# Patient Record
Sex: MALE | Race: OTHER | HISPANIC OR LATINO
[De-identification: names, ages, dates, MRNs, and addresses within clinical notes are randomized per-mention and may not be internally consistent; named-entity substitution may affect disease eponyms.]

---

## 2019-12-06 PROBLEM — Z00.00 ENCOUNTER FOR PREVENTIVE HEALTH EXAMINATION: Status: ACTIVE | Noted: 2019-12-06

## 2019-12-10 ENCOUNTER — APPOINTMENT (OUTPATIENT)
Dept: GERIATRICS | Facility: CLINIC | Age: 79
End: 2019-12-10
Payer: COMMERCIAL

## 2019-12-10 VITALS
SYSTOLIC BLOOD PRESSURE: 90 MMHG | OXYGEN SATURATION: 97 % | TEMPERATURE: 97.9 F | HEIGHT: 64 IN | HEART RATE: 90 BPM | BODY MASS INDEX: 23.39 KG/M2 | WEIGHT: 137 LBS | DIASTOLIC BLOOD PRESSURE: 70 MMHG

## 2019-12-10 DIAGNOSIS — M41.9 SCOLIOSIS, UNSPECIFIED: ICD-10-CM

## 2019-12-10 PROCEDURE — 99205 OFFICE O/P NEW HI 60 MIN: CPT

## 2019-12-10 NOTE — HISTORY OF PRESENT ILLNESS
[2] : 2) Feeling down, depressed, or hopeless for more than half of the days [PHQ-2 Score ___] : PHQ-2 Score [unfilled] [FreeTextEntry1] : 79 year old man\[place] 10\par [employer]\par  of company\par lives in [place]\par retired about 5 years - here alone\par \par wife is pediatric anesthesioloigst - she is in her late 50s\par \par Dr Brown at [place]\par \par came for memory loss\par \par h/o bleeding ulcer many years ago\par \par flu vaccine - needs for 2018\par colonscopy 2017\par dexa 11/18\par \par drinks 20 drinks a week\par wine with dinner\par  he has been depressed and is on zoloft\par \par searches for words\par forgets why he went into room\par retired\par ok with driving - never lost\par concentrates a lot\par able to pay bills/paper work\par hard to multi-task - even music and computer at the same time\par \par spoke to wife on phone\par 2014 cog tests - Mild Cognitive Impairment\par MRI and spect scans 2016\par neuro tests - Dr king \par Dr Carla Maya\par mild decline\par 2018 - neuropsych tests - [facility]\par MOCA 18/30\par bilateral hypofusion -frontal,temporal and parietal - on SPECT scan - suggests early AD\par \par had labs august - wnl\par \par tried aricept in summer took two weeks - gave up\par \par compliant with meds\par \par has been examined by a cardiologist Ruth\par able to walk long distances\par aortic aneurysm - mild\par

## 2019-12-10 NOTE — HISTORY OF PRESENT ILLNESS
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 [place]\par \par came for memory loss\par \par h/o bleeding ulcer many years ago\par \par flu vaccine - needs for 2018\par colonscopy 2017\par dexa 11/18\par \par drinks 20 drinks a week\par wine with dinner\par  he has been depressed and is on zoloft\par \par searches for words\par forgets why he went into room\par retired\par ok with driving - never lost\par concentrates a lot\par able to pay bills/paper work\par hard to multi-task - even music and computer at the same time\par \par spoke to wife on phone\par 2014 cog tests - Mild Cognitive Impairment\par MRI and spect scans 2016\par neuro tests - Dr king \par Dr Carla Maya\par mild decline\par 2018 - neuropsych tests - [facility]\par MOCA 18/30\par bilateral hypofusion -frontal,temporal and parietal - on SPECT scan - suggests early AD\par \par had labs august - wnl\par \par tried aricept in summer took two weeks - gave up\par \par compliant with meds\par \par has been examined by a cardiologist Ruth\par able to walk long distances\par aortic aneurysm - mild\par

## 2019-12-10 NOTE — SOCIAL HISTORY
[Fully functional (bathing, dressing, toileting, transferring, walking, feeding)] : Fully functional (bathing, dressing, toileting, transferring, walking, feeding) [Fully functional (using the telephone, shopping, preparing meals, housekeeping, doing laundry, using transportation,] : Fully functional and needs no help or supervision to perform IADLs (using the telephone, shopping, preparing meals, housekeeping, doing laundry, using transportation, managing medications and managing finances) [Two or more falls in past year] : Patient reported two or more falls in the past year [Carbon Monoxide Detector] : carbon monoxide detector [Smoke Detector] : smoke detector [Seat Belt] :  uses seat belt [Driving] : driving

## 2019-12-10 NOTE — ASSESSMENT
[FreeTextEntry1] : [facility] 25/30\par clock ok\par \par \par \par spoke to wife\par getting copies of neuropsych tests\par spect scans\par labs\par tsh, b12 , lipids\par psa - all normal\par \par to repeat neuropsych tests\par OT for memory help\par consider counseling\par \par pt reluctant re above\par spent 90 mins discussing above with pt and wife on conference call

## 2019-12-10 NOTE — SOCIAL HISTORY
[Two or more falls in past year] : Patient reported two or more falls in the past year [Fully functional (using the telephone, shopping, preparing meals, housekeeping, doing laundry, using transportation,] : Fully functional and needs no help or supervision to perform IADLs (using the telephone, shopping, preparing meals, housekeeping, doing laundry, using transportation, managing medications and managing finances) [Fully functional (bathing, dressing, toileting, transferring, walking, feeding)] : Fully functional (bathing, dressing, toileting, transferring, walking, feeding) [Smoke Detector] : smoke detector [Carbon Monoxide Detector] : carbon monoxide detector [Seat Belt] :  uses seat belt [Driving] : driving

## 2019-12-10 NOTE — ASSESSMENT
[FreeTextEntry1] : [facility] 25/30\par clock ok\par \par \par \par spoke to wife\par getting copies of neuropsych tests\par spect scans\par labs\par tsh, b12 , lipids\par psa - all normal\par \par to repeat neuropsych tests\par OT for memory help\par consider counseling\par \par pt reluctant re above\par spent 90 mins discussing above with pt and wife on conference call

## 2021-04-12 ENCOUNTER — APPOINTMENT (OUTPATIENT)
Dept: NEUROLOGY | Facility: CLINIC | Age: 81
End: 2021-04-12
Payer: COMMERCIAL

## 2021-04-12 VITALS
DIASTOLIC BLOOD PRESSURE: 70 MMHG | BODY MASS INDEX: 20.49 KG/M2 | SYSTOLIC BLOOD PRESSURE: 111 MMHG | TEMPERATURE: 97.2 F | HEART RATE: 71 BPM | WEIGHT: 120 LBS | HEIGHT: 64 IN

## 2021-04-12 PROCEDURE — 99072 ADDL SUPL MATRL&STAF TM PHE: CPT

## 2021-04-12 PROCEDURE — 99204 OFFICE O/P NEW MOD 45 MIN: CPT

## 2021-04-13 RX ORDER — SERTRALINE HYDROCHLORIDE 100 MG/1
100 TABLET, FILM COATED ORAL DAILY
Refills: 0 | Status: DISCONTINUED | COMMUNITY
Start: 2019-12-10 | End: 2021-04-13

## 2021-04-13 RX ORDER — ROSUVASTATIN CALCIUM 40 MG/1
40 TABLET, FILM COATED ORAL
Qty: 90 | Refills: 3 | Status: DISCONTINUED | COMMUNITY
Start: 2019-12-10 | End: 2021-04-13

## 2021-04-15 NOTE — REVIEW OF SYSTEMS
[Anxiety] : anxiety [Depression] : depression [Cough] : cough [Negative] : Heme/Lymph [FreeTextEntry2] : change in weight  [de-identified] : tremor  [FreeTextEntry9] : back pain

## 2021-04-15 NOTE — DATA REVIEWED
[de-identified] : 4/8/21 moderate chronic microvascular ischemic changes, progressed from 2014. Moderate cerebral volume loss, progressed from 2014. \par images personally reviewed.  [de-identified] : 4/8/21 carotid ultrasound <50% bilaterally. \par \par 1/21/21 Vitamin B12 697\par 1/21/21 Lyme negative\par 1/21/21 TSH 1.43\par HgA1C 5.9\par

## 2021-04-15 NOTE — PHYSICAL EXAM
[FreeTextEntry1] : Physical examination \par General: No acute distress, Awake, Alert.   \par OTHER: aphasia \par \par Mental status \par Awake, alert, and oriented to person, time (April 2021) and place, Normal attention span and concentration,  Fund of knowledge intact.   \par Delayed recall 0/3 with hints 3/3.\par 4Q1$\par 7Q1.75$\par Very hesitant speech with word finding difficulty. \par  \par Cranial Nerves \par II: VFF  \par III, IV, VI: PERRL, EOMI.   \par V: Facial sensation is normal B/L.   \par VII: Facial strength is normal B/L. \par \par \par VIII: Gross hearing is intact.   \par \par \par IX, X: Palate is midline and elevates symmetrically.   \par XI: Trapezius normal strength.   \par XII: Tongue midline without atrophy or fasciculations. \par \par Motor exam  \par Muscle tone - cogwheel arms. \par No atrophy or fasciculations \par Muscle Strength: arms and legs, proximal and distal flexors and extensors are normal \par \par No UE drift.\par \par Reflexes \par Arms 2 \par legs 1 \par \par Plantars right: mute.   \par Plantars left: mute.   \par \par Absent ankle jerks.   \par \par Coordination \par Finger to nose: Normal.  \par Heel to shin: Normal.   \par \par Sensory \par Intact sensation to vibration and cold.\par \par Gait \par Slow, wide based gait. \par \par

## 2021-04-15 NOTE — ASSESSMENT
[FreeTextEntry1] : Fritz Salinas is a 80 year old man with progressive word finding difficulties and memory loss. 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  \par Word finding difficulties - speech and cognitive therapy.\par \par Follow up in 3 months. \par \par I discussed in detail with the patient and family the diagnosis, prognosis, treatment plan and answered all of their questions.\par \par Discussed with PMD.

## 2021-04-15 NOTE — HISTORY OF PRESENT ILLNESS
[FreeTextEntry1] : History obtained from patient and wife Dr. Soniya Brown\par \par Fritz Salinas is a 80 year old man with a history of duodenal ulcers and scoliosis presenting for a consultation for word finding difficulties and memory loss. \par \par He endorses that he has been having word finding difficulties and slow, progressive memory difficulties for over seven years. Mr. Salinas is able to manage bills at home as well as investments without errors. He no longer drives at night or on highways. He notes that his language and word finding difficulties have also gradually, progressively worsened over seven years and have become significantly worse in the past year and a half. His wife endorses that he is also more socially isolated due to COVID-19. Mr. Salinas has difficulties with new problems and when a routine is not followed.  He had neuropsychological evaluation done many years ago (previous records not available) and his first MRI was performed in 2014.  He has been taking Donepezil 10mg daily for three months without side effects.\par \par He denies any history of stroke. He was on aspirin many years ago. \par \par The remaining neurological review of systems is negative.

## 2021-07-15 ENCOUNTER — APPOINTMENT (OUTPATIENT)
Dept: NEUROLOGY | Facility: CLINIC | Age: 81
End: 2021-07-15
Payer: COMMERCIAL

## 2021-07-15 VITALS
WEIGHT: 120 LBS | TEMPERATURE: 97.6 F | DIASTOLIC BLOOD PRESSURE: 79 MMHG | SYSTOLIC BLOOD PRESSURE: 120 MMHG | BODY MASS INDEX: 20.49 KG/M2 | HEIGHT: 64 IN | HEART RATE: 54 BPM

## 2021-07-15 PROCEDURE — 99072 ADDL SUPL MATRL&STAF TM PHE: CPT

## 2021-07-15 PROCEDURE — 99214 OFFICE O/P EST MOD 30 MIN: CPT

## 2021-07-16 NOTE — HISTORY OF PRESENT ILLNESS
[FreeTextEntry1] : There has been no significant changes since his last visit.  He has word finding difficulty predominantly.  He has good days and not his good days.  He has depression.  He is able to keep track of his stocks and perform most of his IADLs.\par He has had worsening over the past 7 years.\par He is tolerating memantine ER 28 mg daily and donepezil 10 mg daily.\par He does not want to repeat neuropsychological evaluation.\par He has had speech therapy in the past and does not want to do this again.\par \par \par Note from April 12, 2021\par History obtained from patient and wife Dr. Soniya Brown\par \par Fritz Salinas is a 80 year old man with a history of duodenal ulcers and scoliosis presenting for a consultation for word finding difficulties and memory loss. \par \par He endorses that he has been having word finding difficulties and slow, progressive memory difficulties for over seven years. Mr. Salinas is able to manage bills at home as well as investments without errors. He no longer drives at night or on highways. He notes that his language and word finding difficulties have also gradually, progressively worsened over seven years and have become significantly worse in the past year and a half. His wife endorses that he is also more socially isolated due to COVID-19. Mr. Salinas has difficulties with new problems and when a routine is not followed.  He had neuropsychological evaluation done many years ago (previous records not available) and his first MRI was performed in 2014.  He has been taking Donepezil 10mg daily for three months without side effects.\par \par He denies any history of stroke. He was on aspirin many years ago. \par \par The remaining neurological review of systems is negative.

## 2021-07-16 NOTE — DATA REVIEWED
[de-identified] : 4/8/21 moderate chronic microvascular ischemic changes, progressed from 2014. Moderate cerebral volume loss, progressed from 2014. \par images personally reviewed.  [de-identified] : 4/8/21 carotid ultrasound <50% bilaterally. \par \par 1/21/21 Vitamin B12 697\par 1/21/21 Lyme negative\par 1/21/21 TSH 1.43\par HgA1C 5.9\par

## 2021-07-16 NOTE — REVIEW OF SYSTEMS
[Negative] : Heme/Lymph [Anxiety] : anxiety [Depression] : depression [Cough] : cough [FreeTextEntry2] : change in weight  [de-identified] : tremor  [FreeTextEntry9] : back pain

## 2021-07-16 NOTE — PHYSICAL EXAM
[FreeTextEntry1] : Physical examination \par General: No acute distress, Awake, Alert.   \par aphasia \par \par Mental status \par Awake, alert, and oriented to person, time and place, Normal attention span and concentration,  Fund of knowledge intact.   \par Delayed recall 0/3 with hints 3/3.\par 4Q1$\par 7Q1.75$\par Very hesitant speech with word finding difficulty. \par  \par Cranial Nerves \par II: VFF  \par III, IV, VI: PERRL, EOMI.   \par V: Facial sensation is normal B/L.   \par VII: Facial strength is normal B/L. \par \par \par VIII: Gross hearing is intact.   \par \par \par IX, X: Palate is midline and elevates symmetrically.   \par XI: Trapezius normal strength.   \par XII: Tongue midline without atrophy or fasciculations. \par \par Motor exam  \par Muscle tone - cogwheel arms. \par No atrophy or fasciculations \par Muscle Strength: arms and legs, proximal and distal flexors and extensors are normal \par \par No UE drift.\par \par \par Coordination \par Finger to nose: Normal.  \par Heel to shin: Normal.   \par \par Gait \par Slow, wide based gait. \par \par

## 2021-07-16 NOTE — ASSESSMENT
[FreeTextEntry1] : Fritz Salinas is a 80 year old man with progressive word finding difficulties and memory loss. 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 \par \par I discussed in detail with the patient and family the diagnosis, prognosis, treatment plan and answered all of their questions.

## 2022-01-11 ENCOUNTER — APPOINTMENT (OUTPATIENT)
Dept: NEUROLOGY | Facility: CLINIC | Age: 82
End: 2022-01-11

## 2022-01-11 ENCOUNTER — APPOINTMENT (OUTPATIENT)
Dept: NEUROLOGY | Facility: CLINIC | Age: 82
End: 2022-01-11
Payer: COMMERCIAL

## 2022-01-11 PROCEDURE — 99215 OFFICE O/P EST HI 40 MIN: CPT | Mod: 95

## 2022-01-11 RX ORDER — MEMANTINE HYDROCHLORIDE 21 MG/1
21 CAPSULE, EXTENDED RELEASE ORAL
Qty: 7 | Refills: 0 | Status: DISCONTINUED | COMMUNITY
Start: 2021-04-12 | End: 2022-01-11

## 2022-01-11 RX ORDER — ASPIRIN 81 MG
81 TABLET, DELAYED RELEASE (ENTERIC COATED) ORAL
Refills: 0 | Status: ACTIVE | COMMUNITY

## 2022-01-11 RX ORDER — MEMANTINE HYDROCHLORIDE 7 MG/1
7 CAPSULE, EXTENDED RELEASE ORAL
Qty: 7 | Refills: 0 | Status: DISCONTINUED | COMMUNITY
Start: 2021-04-12 | End: 2022-01-11

## 2022-01-11 RX ORDER — MEMANTINE HYDROCHLORIDE 14 MG/1
14 CAPSULE, EXTENDED RELEASE ORAL
Qty: 7 | Refills: 0 | Status: DISCONTINUED | COMMUNITY
Start: 2021-04-12 | End: 2022-01-11

## 2022-01-11 RX ORDER — ESCITALOPRAM OXALATE 20 MG/1
TABLET ORAL
Refills: 0 | Status: DISCONTINUED | COMMUNITY
End: 2022-01-11

## 2022-01-11 NOTE — ASSESSMENT
[FreeTextEntry1] : Fritz Salinas is a 81 year old man with progressive word finding difficulties and memory loss. 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 \par \par Follow up in office with Dr. Navarrete or Dr. Shirley for evaluation and to reestablish care. \par \par I discussed in detail with the patient and family the diagnosis, prognosis, treatment plan and answered all of their questions.

## 2022-01-11 NOTE — REASON FOR VISIT
[Home] : at home, [unfilled] , at the time of the visit. [Medical Office: ([facility])___] : at the medical office located in  [Verbal consent obtained from patient] : the patient, [unfilled] [FreeTextEntry1] : speech and memory difficulties.

## 2022-01-11 NOTE — DATA REVIEWED
[de-identified] : 4/8/21 moderate chronic microvascular ischemic changes, progressed from 2014. Moderate cerebral volume loss, progressed from 2014. \par images personally reviewed.  [de-identified] : 4/8/21 carotid ultrasound <50% bilaterally. \par \par 1/21/21 Vitamin B12 697\par 1/21/21 Lyme negative\par 1/21/21 TSH 1.43\par HgA1C 5.9\par

## 2022-01-11 NOTE — HISTORY OF PRESENT ILLNESS
[FreeTextEntry1] : History obtained from patient and wife Dr. Soniya Brown\par \par Fritz Salinas is a right handed 81 year old man with a history of duodenal ulcers and scoliosis presenting for a follow up for word finding difficulties and memory loss. \par \par As per his wife, he has some cognitive improvement since last visit. He has difficulty learning new tasks and difficulty with short term memory. She keeps his appointments. He pays the bills, keeps track of his stocks, organizes his own medications, and performs most of his IADLs. He is tolerating Memantine ER 28mg daily and Donepezil 10mg daily. \par \par He reports his speech is unchanged. Denies dysphagia. \par \par He was noted to have a tremor in both hands with action on his last visit with his PCP in November. Mr. Rainey denies that the tremor interferes with eating or drinking. Denies rest tremor. His wife noted some shuffling of his gait. Denies freezing of gait. He walks three to four miles daily. Denies any slowness with his daily activities. He suffered one fall a few months ago because his dog pulled him and he fractured his right fifth digit. After the fracture, he cannot fully close his right hand and did PT and OT. Does not use any assistive device.  He had difficulty remembering the exercises for PT and OT.\par \par  Endorses vivid dreams for the past year, denies acting out dreams. Denies micrographia but has handwriting changes.\par \par Denies weakness, numbness or tingling.\par \par The remaining neurological review of systems is negative. \par \par 7/15/21\par There has been no significant changes since his last visit.  He has word finding difficulty predominantly.  He has good days and not his good days.  He has depression.  He is able to keep track of his stocks and perform most of his IADLs.\par He has had worsening over the past 7 years.\par He is tolerating memantine ER 28 mg daily and donepezil 10 mg daily.\par He does not want to repeat neuropsychological evaluation.\par He has had speech therapy in the past and does not want to do this again.\par \par \par Note from April 12, 2021\par History obtained from patient and wife Dr. Soniya Brown\par \par Fritz Salinas is a 80 year old man with a history of duodenal ulcers and scoliosis presenting for a consultation for word finding difficulties and memory loss. \par \par He endorses that he has been having word finding difficulties and slow, progressive memory difficulties for over seven years. Mr. Salinas is able to manage bills at home as well as investments without errors. He no longer drives at night or on highways. He notes that his language and word finding difficulties have also gradually, progressively worsened over seven years and have become significantly worse in the past year and a half. His wife endorses that he is also more socially isolated due to COVID-19. Mr. Salinas has difficulties with new problems and when a routine is not followed.  He had neuropsychological evaluation done many years ago (previous records not available) and his first MRI was performed in 2014.  He has been taking Donepezil 10mg daily for three months without side effects.\par \par He denies any history of stroke. He was on aspirin many years ago. \par \par The remaining neurological review of systems is negative.

## 2022-01-11 NOTE — PHYSICAL EXAM
[FreeTextEntry1] : Exam limited due to telehealth. \par \par Physical examination \par General: No acute distress, Awake, Alert.   \par aphasia \par \par other: no tremor noted during telehealth appointment. \par \par Mental status \par Awake, alert, and oriented to person, time and place, Normal attention span and concentration,  Fund of knowledge intact.   \par Delayed recall 0/3 with hints 2/3.\par 4Q1$\par 7Q1.75$\par not able to do serial 7 calculations\par Very hesitant speech with word finding difficulty. \par  \par Cranial Nerves \par III, IV, VI:  EOMI.   \par V: Facial sensation is normal B/L.   \par VII: Facial strength is normal B/L. \par \par \par VIII: Gross hearing is intact.   \par \par \par IX, X: Palate is midline and elevates symmetrically.   \par \par XII: Tongue midline without atrophy or fasciculations. \par \par Motor exam  \par  Muscle Strength: arms and legs, proximal and distal flexors and extensors are normal (5/5 -tested with wife-physician)\par \par No UE drift.\par \par \par Coordination \par Finger to nose: Normal.  \par  \par \par Gait \par Slow, wide based gait. No freezing of gait noted. \par \par 7/15/21\par \par Physical examination \par General: No acute distress, Awake, Alert.   \par aphasia \par \par Mental status \par Awake, alert, and oriented to person, time and place, Normal attention span and concentration,  Fund of knowledge intact.   \par Delayed recall 0/3 with hints 3/3.\par 4Q1$\par 7Q1.75$\par Very hesitant speech with word finding difficulty. \par  \par Cranial Nerves \par II: VFF  \par III, IV, VI: PERRL, EOMI.   \par V: Facial sensation is normal B/L.   \par VII: Facial strength is normal B/L. \par \par \par VIII: Gross hearing is intact.   \par \par \par IX, X: Palate is midline and elevates symmetrically.   \par XI: Trapezius normal strength.   \par XII: Tongue midline without atrophy or fasciculations. \par \par Motor exam  \par Muscle tone - cogwheel arms. \par No atrophy or fasciculations \par Muscle Strength: arms and legs, proximal and distal flexors and extensors are normal \par \par No UE drift.\par \par \par Coordination \par Finger to nose: Normal.  \par Heel to shin: Normal.   \par \par Gait \par Slow, wide based gait. \par \par

## 2022-05-23 ENCOUNTER — RESULT REVIEW (OUTPATIENT)
Age: 82
End: 2022-05-23

## 2022-07-06 ENCOUNTER — APPOINTMENT (OUTPATIENT)
Dept: NEUROLOGY | Facility: CLINIC | Age: 82
End: 2022-07-06

## 2022-07-06 VITALS
WEIGHT: 129 LBS | SYSTOLIC BLOOD PRESSURE: 102 MMHG | DIASTOLIC BLOOD PRESSURE: 63 MMHG | BODY MASS INDEX: 22.02 KG/M2 | HEIGHT: 64 IN | HEART RATE: 62 BPM

## 2022-07-06 PROCEDURE — 99215 OFFICE O/P EST HI 40 MIN: CPT

## 2022-07-06 NOTE — DISCUSSION/SUMMARY
[FreeTextEntry1] : Fritz Blanco is a 81 year old M with a PMHx of HLD, BPH, Depression, HTN, and Primary progressive aphasia, nonfluent variant. He was referred by Deepika Mejia NP with concern for possible Parkinson disease as indicated by his PCP. Patient is a former physician.\par \par The patient's history is concerning for Dementia. 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 FTD with primary progressive aphasia subtype vs. vascular dementia. Patient has significant short term memory loss and word finding difficulties, but still retains his insight into his deficits. \par \par The specific type of dementia that he has has not officially been delineated. I recommend obtaining an FDG-PET scan now to decipher the diagnosis, prognosis and future planning.\par \par Encouraged to increase exercise and physical activity and maintain an active social and intellectual life.\par \par All questions were answered to their satisfaction. I spent 60 minutes on this encounter.

## 2022-07-06 NOTE — REVIEW OF SYSTEMS
[FreeTextEntry1] : Pertinent positives and negatives are outlined in the HPI. dietitian/nutrition services

## 2022-07-06 NOTE — HISTORY OF PRESENT ILLNESS
[FreeTextEntry1] : Fritz Blanco is a 81 year old M with a PMHx of HLD, BPH, Depression, HTN, and Primary progressive aphasia, nonfluent variant. He was referred by Deepika Mejia NP with concern for possible Parkinson disease as indicated by his PCP. Patient is a former physician.\par \par His memory is getting worse. He repeats questions and gets disoriented regarding the date. He fell a month ago, hit the back of his head, and he had a syncopal event that night on the toilet and went to the ED. He had a mechanical fall. \par There was concern that he is rigid in his gait during the Spring. \par Memory issues started in 2014.\par \par No trouble turning or adjusting in bed at night. He does shuffle sometimes and he goes to PT, he drags the right leg a bit. He is off balance with scoliosis. No trouble with buttons, zippers, shoelaces or cutting food. \par \par No changes in sense of smell.\par He has stooped posture.\par No changes in facial expression. \par He has some decrease in the volume of his voice.\par No trouble swallowing.\par Handwriting has changed, it is worse, unclear if it is worse. He usually only signs.\par No constipation, having a daily bowel movement. \par No trouble with urination. No nocturia. He was having retention in the past related to BPH.\par No trouble sleeping. He has vivid dreams that are not pleasant. He does talk in his sleep. \par No dizziness or lightheadedness when standing. \par No history of hallucinations.\par Mood is not depressed or anxious. On sertraline for many years. \par \par He is currently on Memantine and Donepezil.\par \par MRI brain without contrast done in 4/2021 was significant for moderate chronic microvascular disease that had progressed since 2014.\par Per patient's wife, he had an FDG-PET scan done in 2014 that was suggestive of Alzheimer's vs. Depression. 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

## 2022-07-06 NOTE — PHYSICAL EXAM
[Person] : oriented to person [Place] : oriented to place [Concentration Intact] : a decrease in concentrating ability was observed [Comprehension] : comprehension intact [Cranial Nerves Optic (II)] : visual acuity intact bilaterally,  visual fields full to confrontation, pupils equal round and reactive to light [Cranial Nerves Oculomotor (III)] : extraocular motion intact [Cranial Nerves Trigeminal (V)] : facial sensation intact symmetrically [Cranial Nerves Facial (VII)] : face symmetrical [Cranial Nerves Vestibulocochlear (VIII)] : hearing was intact bilaterally [Cranial Nerves Glossopharyngeal (IX)] : tongue and palate midline [Cranial Nerves Accessory (XI - Cranial And Spinal)] : head turning and shoulder shrug symmetric [Cranial Nerves Hypoglossal (XII)] : there was no tongue deviation with protrusion [Motor Strength] : muscle strength was normal in all four extremities [Paresis Pronator Drift Right-Sided] : no pronator drift on the right [Paresis Pronator Drift Left-Sided] : no pronator drift on the left [Sensation Tactile Decrease] : light touch was intact [Coordination - Dysmetria Impaired Finger-to-Nose Bilateral] : not present [1+] : Patella left 1+ [0] : Ankle jerk left 0 [FreeTextEntry6] : trigger finger of the right 5th digit [FreeTextEntry1] : No bradykinesia, rigidity or tremors.\par \par Gait is wide based, with decreased arm swing, right more than left. No significant shuffling. No freezing of gait.\par Posture is stooped.\par One attempt to stand with arms crossed.\par Postural reflexes are intact.

## 2022-10-05 ENCOUNTER — RESULT REVIEW (OUTPATIENT)
Age: 82
End: 2022-10-05

## 2022-10-05 ENCOUNTER — TRANSCRIPTION ENCOUNTER (OUTPATIENT)
Age: 82
End: 2022-10-05

## 2022-10-12 ENCOUNTER — TRANSCRIPTION ENCOUNTER (OUTPATIENT)
Age: 82
End: 2022-10-12

## 2022-10-18 ENCOUNTER — TRANSCRIPTION ENCOUNTER (OUTPATIENT)
Age: 82
End: 2022-10-18

## 2022-10-25 ENCOUNTER — NON-APPOINTMENT (OUTPATIENT)
Age: 82
End: 2022-10-25

## 2022-12-06 ENCOUNTER — APPOINTMENT (OUTPATIENT)
Dept: NEUROLOGY | Facility: CLINIC | Age: 82
End: 2022-12-06

## 2023-01-04 ENCOUNTER — APPOINTMENT (OUTPATIENT)
Dept: NEUROLOGY | Facility: CLINIC | Age: 83
End: 2023-01-04

## 2023-03-17 ENCOUNTER — TRANSCRIPTION ENCOUNTER (OUTPATIENT)
Age: 83
End: 2023-03-17

## 2023-03-24 ENCOUNTER — TRANSCRIPTION ENCOUNTER (OUTPATIENT)
Age: 83
End: 2023-03-24

## 2023-03-27 ENCOUNTER — TRANSCRIPTION ENCOUNTER (OUTPATIENT)
Age: 83
End: 2023-03-27

## 2023-03-28 ENCOUNTER — TRANSCRIPTION ENCOUNTER (OUTPATIENT)
Age: 83
End: 2023-03-28

## 2023-03-31 ENCOUNTER — TRANSCRIPTION ENCOUNTER (OUTPATIENT)
Age: 83
End: 2023-03-31

## 2023-04-07 ENCOUNTER — APPOINTMENT (OUTPATIENT)
Dept: GERIATRICS | Facility: CLINIC | Age: 83
End: 2023-04-07
Payer: COMMERCIAL

## 2023-04-07 VITALS
BODY MASS INDEX: 24.48 KG/M2 | DIASTOLIC BLOOD PRESSURE: 78 MMHG | TEMPERATURE: 97 F | SYSTOLIC BLOOD PRESSURE: 114 MMHG | HEART RATE: 58 BPM | WEIGHT: 142.6 LBS | OXYGEN SATURATION: 97 %

## 2023-04-07 DIAGNOSIS — E78.00 PURE HYPERCHOLESTEROLEMIA, UNSPECIFIED: ICD-10-CM

## 2023-04-07 DIAGNOSIS — Z80.0 FAMILY HISTORY OF MALIGNANT NEOPLASM OF DIGESTIVE ORGANS: ICD-10-CM

## 2023-04-07 DIAGNOSIS — I10 ESSENTIAL (PRIMARY) HYPERTENSION: ICD-10-CM

## 2023-04-07 DIAGNOSIS — Z82.49 FAMILY HISTORY OF ISCHEMIC HEART DISEASE AND OTHER DISEASES OF THE CIRCULATORY SYSTEM: ICD-10-CM

## 2023-04-07 DIAGNOSIS — N40.0 BENIGN PROSTATIC HYPERPLASIA WITHOUT LOWER URINARY TRACT SYMPMS: ICD-10-CM

## 2023-04-07 PROCEDURE — 99204 OFFICE O/P NEW MOD 45 MIN: CPT | Mod: 25

## 2023-04-07 PROCEDURE — 36415 COLL VENOUS BLD VENIPUNCTURE: CPT

## 2023-04-10 PROBLEM — I10 BENIGN ESSENTIAL HYPERTENSION: Status: ACTIVE | Noted: 2019-12-10

## 2023-04-10 PROBLEM — Z80.0 FAMILY HISTORY OF COLON CANCER: Status: ACTIVE | Noted: 2023-04-10

## 2023-04-10 PROBLEM — N40.0 BPH (BENIGN PROSTATIC HYPERPLASIA): Status: ACTIVE | Noted: 2019-12-10

## 2023-04-10 PROBLEM — E78.00 HIGH CHOLESTEROL: Status: ACTIVE | Noted: 2019-12-10

## 2023-04-10 PROBLEM — Z82.49 FAMILY HISTORY OF CARDIAC DISORDER: Status: ACTIVE | Noted: 2023-04-10

## 2023-04-10 RX ORDER — FINASTERIDE 1 MG/1
TABLET ORAL
Refills: 0 | Status: COMPLETED | COMMUNITY
End: 2023-04-10

## 2023-04-10 RX ORDER — DUTASTERIDE 0.5 MG/1
0.5 CAPSULE, LIQUID FILLED ORAL
Qty: 90 | Refills: 1 | Status: ACTIVE | COMMUNITY
Start: 2019-12-10

## 2023-04-10 RX ORDER — VALSARTAN 80 MG/1
80 TABLET, COATED ORAL DAILY
Refills: 0 | Status: COMPLETED | COMMUNITY
Start: 2019-12-10 | End: 2023-04-10

## 2023-04-10 NOTE — PHYSICAL EXAM
[No Acute Distress] : no acute distress [Well-Appearing] : well-appearing [Normal Sclera/Conjunctiva] : normal sclera/conjunctiva [PERRL] : pupils equal round and reactive to light [EOMI] : extraocular movements intact [Normal Outer Ear/Nose] : the outer ears and nose were normal in appearance [Normal Oropharynx] : the oropharynx was normal [Normal TMs] : both tympanic membranes were normal [No JVD] : no jugular venous distention [No Lymphadenopathy] : no lymphadenopathy [Supple] : supple [No Respiratory Distress] : no respiratory distress  [No Accessory Muscle Use] : no accessory muscle use [Clear to Auscultation] : lungs were clear to auscultation bilaterally [Normal Rate] : normal rate  [Regular Rhythm] : with a regular rhythm [Normal S1, S2] : normal S1 and S2 [No Carotid Bruits] : no carotid bruits [No Abdominal Bruit] : a ~M bruit was not heard ~T in the abdomen [Soft] : abdomen soft [Non Tender] : non-tender [Non-distended] : non-distended [Normal Bowel Sounds] : normal bowel sounds [Normal Posterior Cervical Nodes] : no posterior cervical lymphadenopathy [Normal Anterior Cervical Nodes] : no anterior cervical lymphadenopathy [No Spinal Tenderness] : no spinal tenderness [Scoliosis] : scoliosis [Grossly Normal Strength/Tone] : grossly normal strength/tone [No Focal Deficits] : no focal deficits [Speech Grossly Normal] : speech grossly normal [Normal Affect] : the affect was normal

## 2023-04-10 NOTE — HEALTH RISK ASSESSMENT
[Good] : ~his/her~  mood as  good [No] : In the past 12 months have you used drugs other than those required for medical reasons? No [No falls in past year] : Patient reported no falls in the past year [0] : 2) Feeling down, depressed, or hopeless: Not at all (0) [PHQ-2 Negative - No further assessment needed] : PHQ-2 Negative - No further assessment needed [[id number]] : 0 [Change in mental status noted] : Change in mental status noted [None] : None [With Significant Other] : lives with significant other [Retired] : retired [] :  [Feels Safe at Home] : Feels safe at home [Fully functional (bathing, dressing, toileting, transferring, walking, feeding)] : Fully functional (bathing, dressing, toileting, transferring, walking, feeding) [de-identified] : AD [de-identified] : Needs assistance [Never] : Never

## 2023-04-10 NOTE — HISTORY OF PRESENT ILLNESS
[Spouse] : spouse [FreeTextEntry1] : Re-establish care [de-identified] : 82 year old male with a history of HTN, BPH, depression / anxiety, Alzheimer's dementia, HLD, scoliosis who presents today to re-establish care.\par \par AD, following with Dr. Shirley.  On memantine ER 28 mg daily.  Lives with wife.  Able to perform self ADLs.  Wife managing finances.  \par \par HLD: Currently on crestor 10 mg daily and zetia 10 mg daily.  Last LP from 2021 with Dr. Brown. \par \par Depression / anxiety: On Sertraline 100 mg daily.

## 2023-04-13 LAB
25(OH)D3 SERPL-MCNC: 17.2 NG/ML
ANION GAP SERPL CALC-SCNC: 10 MMOL/L
BUN SERPL-MCNC: 19 MG/DL
CALCIUM SERPL-MCNC: 9.2 MG/DL
CHLORIDE SERPL-SCNC: 104 MMOL/L
CHOLEST SERPL-MCNC: 195 MG/DL
CO2 SERPL-SCNC: 26 MMOL/L
CREAT SERPL-MCNC: 0.8 MG/DL
EGFR: 89 ML/MIN/1.73M2
ESTIMATED AVERAGE GLUCOSE: 117 MG/DL
FOLATE SERPL-MCNC: >20 NG/ML
GLUCOSE SERPL-MCNC: 95 MG/DL
HBA1C MFR BLD HPLC: 5.7 %
HDLC SERPL-MCNC: 90 MG/DL
LDLC SERPL CALC-MCNC: 88 MG/DL
NONHDLC SERPL-MCNC: 105 MG/DL
POTASSIUM SERPL-SCNC: 4.3 MMOL/L
SODIUM SERPL-SCNC: 140 MMOL/L
TRIGL SERPL-MCNC: 85 MG/DL
TSH SERPL-ACNC: 1.08 UIU/ML
VIT B12 SERPL-MCNC: 740 PG/ML

## 2023-07-10 ENCOUNTER — APPOINTMENT (OUTPATIENT)
Dept: NEUROLOGY | Facility: CLINIC | Age: 83
End: 2023-07-10
Payer: COMMERCIAL

## 2023-07-10 DIAGNOSIS — G30.1 ALZHEIMER'S DISEASE WITH LATE ONSET: ICD-10-CM

## 2023-07-10 DIAGNOSIS — F02.818 ALZHEIMER'S DISEASE WITH LATE ONSET: ICD-10-CM

## 2023-07-10 PROCEDURE — 99442: CPT

## 2023-09-13 ENCOUNTER — APPOINTMENT (OUTPATIENT)
Dept: GERIATRICS | Facility: CLINIC | Age: 83
End: 2023-09-13
Payer: COMMERCIAL

## 2023-09-13 VITALS
HEART RATE: 72 BPM | OXYGEN SATURATION: 95 % | HEIGHT: 64 IN | WEIGHT: 136 LBS | TEMPERATURE: 97.4 F | SYSTOLIC BLOOD PRESSURE: 98 MMHG | DIASTOLIC BLOOD PRESSURE: 66 MMHG | BODY MASS INDEX: 23.22 KG/M2

## 2023-09-13 PROCEDURE — 99213 OFFICE O/P EST LOW 20 MIN: CPT | Mod: 25

## 2023-09-13 PROCEDURE — 36415 COLL VENOUS BLD VENIPUNCTURE: CPT

## 2023-09-13 RX ORDER — CHLORHEXIDINE GLUCONATE 4 %
LIQUID (ML) TOPICAL
Refills: 0 | Status: ACTIVE | COMMUNITY
Start: 2023-09-13

## 2023-09-13 RX ORDER — ALFUZOSIN HYDROCHLORIDE 10 MG/1
10 TABLET, EXTENDED RELEASE ORAL DAILY
Refills: 0 | Status: COMPLETED | COMMUNITY
End: 2023-09-13

## 2023-09-13 RX ORDER — QUETIAPINE FUMARATE 25 MG/1
25 TABLET ORAL
Qty: 30 | Refills: 0 | Status: COMPLETED | COMMUNITY
Start: 2023-07-10 | End: 2023-09-13

## 2023-09-15 LAB
25(OH)D3 SERPL-MCNC: 26.4 NG/ML
ALBUMIN SERPL ELPH-MCNC: 4.3 G/DL
ALP BLD-CCNC: 67 U/L
ALT SERPL-CCNC: 55 U/L
ANION GAP SERPL CALC-SCNC: 13 MMOL/L
APPEARANCE: CLEAR
AST SERPL-CCNC: 59 U/L
BACTERIA UR CULT: NORMAL
BACTERIA: NEGATIVE /HPF
BASOPHILS # BLD AUTO: 0.05 K/UL
BASOPHILS NFR BLD AUTO: 0.7 %
BILIRUB SERPL-MCNC: 0.4 MG/DL
BILIRUBIN URINE: NEGATIVE
BLOOD URINE: NEGATIVE
BUN SERPL-MCNC: 22 MG/DL
CALCIUM SERPL-MCNC: 9.3 MG/DL
CAST: 0 /LPF
CHLORIDE SERPL-SCNC: 104 MMOL/L
CO2 SERPL-SCNC: 21 MMOL/L
COLOR: YELLOW
CREAT SERPL-MCNC: 1.08 MG/DL
EGFR: 69 ML/MIN/1.73M2
EOSINOPHIL # BLD AUTO: 0.62 K/UL
EOSINOPHIL NFR BLD AUTO: 8.6 %
EPITHELIAL CELLS: 0 /HPF
ESTIMATED AVERAGE GLUCOSE: 114 MG/DL
GLUCOSE QUALITATIVE U: NEGATIVE MG/DL
GLUCOSE SERPL-MCNC: 109 MG/DL
HBA1C MFR BLD HPLC: 5.6 %
HCT VFR BLD CALC: 39.3 %
HGB BLD-MCNC: 13.1 G/DL
IMM GRANULOCYTES NFR BLD AUTO: 0.4 %
KETONES URINE: ABNORMAL MG/DL
LEUKOCYTE ESTERASE URINE: NEGATIVE
LYMPHOCYTES # BLD AUTO: 1.38 K/UL
LYMPHOCYTES NFR BLD AUTO: 19.1 %
MAN DIFF?: NORMAL
MCHC RBC-ENTMCNC: 32.1 PG
MCHC RBC-ENTMCNC: 33.3 GM/DL
MCV RBC AUTO: 96.3 FL
MICROSCOPIC-UA: NORMAL
MONOCYTES # BLD AUTO: 0.54 K/UL
MONOCYTES NFR BLD AUTO: 7.5 %
NEUTROPHILS # BLD AUTO: 4.61 K/UL
NEUTROPHILS NFR BLD AUTO: 63.7 %
NITRITE URINE: NEGATIVE
PH URINE: 6
PLATELET # BLD AUTO: 221 K/UL
POTASSIUM SERPL-SCNC: 4.2 MMOL/L
PROT SERPL-MCNC: 6.8 G/DL
PROTEIN URINE: NEGATIVE MG/DL
RBC # BLD: 4.08 M/UL
RBC # FLD: 14.6 %
RED BLOOD CELLS URINE: 1 /HPF
SODIUM SERPL-SCNC: 139 MMOL/L
SPECIFIC GRAVITY URINE: 1.02
TSH SERPL-ACNC: 1.61 UIU/ML
UROBILINOGEN URINE: 0.2 MG/DL
WBC # FLD AUTO: 7.23 K/UL
WHITE BLOOD CELLS URINE: 0 /HPF

## 2023-10-13 ENCOUNTER — LABORATORY RESULT (OUTPATIENT)
Age: 83
End: 2023-10-13

## 2023-10-16 ENCOUNTER — LABORATORY RESULT (OUTPATIENT)
Age: 83
End: 2023-10-16

## 2023-12-05 ENCOUNTER — APPOINTMENT (OUTPATIENT)
Dept: GERIATRICS | Facility: CLINIC | Age: 83
End: 2023-12-05
Payer: COMMERCIAL

## 2023-12-05 VITALS
BODY MASS INDEX: 23 KG/M2 | TEMPERATURE: 97.1 F | SYSTOLIC BLOOD PRESSURE: 90 MMHG | HEART RATE: 68 BPM | WEIGHT: 134 LBS | DIASTOLIC BLOOD PRESSURE: 66 MMHG | OXYGEN SATURATION: 100 %

## 2023-12-05 DIAGNOSIS — E86.0 DEHYDRATION: ICD-10-CM

## 2023-12-05 DIAGNOSIS — F32.A DEPRESSION, UNSPECIFIED: ICD-10-CM

## 2023-12-05 PROCEDURE — 99214 OFFICE O/P EST MOD 30 MIN: CPT

## 2023-12-06 ENCOUNTER — NON-APPOINTMENT (OUTPATIENT)
Age: 83
End: 2023-12-06

## 2023-12-19 ENCOUNTER — APPOINTMENT (OUTPATIENT)
Dept: GERIATRICS | Facility: CLINIC | Age: 83
End: 2023-12-19
Payer: COMMERCIAL

## 2023-12-19 VITALS
BODY MASS INDEX: 23 KG/M2 | SYSTOLIC BLOOD PRESSURE: 119 MMHG | DIASTOLIC BLOOD PRESSURE: 78 MMHG | OXYGEN SATURATION: 99 % | RESPIRATION RATE: 16 BRPM | WEIGHT: 134 LBS | HEART RATE: 68 BPM | TEMPERATURE: 97.6 F

## 2023-12-19 DIAGNOSIS — R41.3 OTHER AMNESIA: ICD-10-CM

## 2023-12-19 DIAGNOSIS — R47.89 OTHER SPEECH DISTURBANCES: ICD-10-CM

## 2023-12-19 PROCEDURE — 99205 OFFICE O/P NEW HI 60 MIN: CPT

## 2023-12-19 PROCEDURE — 99215 OFFICE O/P EST HI 40 MIN: CPT

## 2023-12-19 RX ORDER — B-COMPLEX WITH VITAMIN C
TABLET ORAL
Refills: 0 | Status: DISCONTINUED | COMMUNITY
End: 2023-12-19

## 2023-12-19 RX ORDER — SERTRALINE HYDROCHLORIDE 100 MG/1
100 TABLET, FILM COATED ORAL DAILY
Qty: 90 | Refills: 1 | Status: ACTIVE | COMMUNITY

## 2023-12-28 ENCOUNTER — NON-APPOINTMENT (OUTPATIENT)
Age: 83
End: 2023-12-28

## 2023-12-29 RX ORDER — RISPERIDONE 0.25 MG/1
0.25 TABLET, FILM COATED ORAL
Qty: 30 | Refills: 0 | Status: DISCONTINUED | COMMUNITY
Start: 2023-12-19 | End: 2023-12-29

## 2024-01-08 RX ORDER — QUETIAPINE 50 MG/1
50 TABLET, FILM COATED, EXTENDED RELEASE ORAL DAILY
Qty: 30 | Refills: 0 | Status: DISCONTINUED | COMMUNITY
Start: 2023-12-29 | End: 2024-01-08

## 2024-01-08 NOTE — PHYSICAL EXAM
[General Appearance - Alert] : alert [Sclera] : the sclera and conjunctiva were normal [Normal Oral Mucosa] : normal oral mucosa [Neck Appearance] : the appearance of the neck was normal [] : no respiratory distress [Edema] : there was no peripheral edema [FreeTextEntry1] : + word finding difficulties, unable to recall details about his children

## 2024-01-08 NOTE — ASSESSMENT
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 PPS 50- 60%.   # Dementia with behavioral disturbances - Trial of Risperidone 0.25mg PO QAM - He is resistant to taking medications at night but per wife, will take in AM - Increased fall risk due to ETOH + preexisting shuffling gait- wife aware - Wife looking to increase Saul (HHA/)'s hours  # ACP - Pt already had a living will and HCP and has told his wife in the past that he is ready to die - MOLST completed- DNR/DNI, no feeding tube, no HD, limited interventions  # Social support - Referred wife to [facility] for caregiver support  RTC in 1 month or PRN sooner

## 2024-01-08 NOTE — HISTORY OF PRESENT ILLNESS
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 He is accompanied by his wife, Soniya.   12/19 (Initial eval): Patient reports feeling well and no complaints other than various issues with his family. He believes his wife is rosario, that his life was ruined by his pinky fracture a few years ago, and various other situations that upset him. Per speaking with wife privately, he has delusions mainly channeled via anger toward her that he fixates upon. Soniya works full time as a pediatric anesthesiologist at [employer], and they are in the process of getting patient more help at home as she is concerned about his risk of falls. He has been resistant to all other caregivers aside from their longtime , Robin, whom Soniya is working on increasing her hours with for better coverage. Patient drinks 2-3 glasses of wine a day religiously. He still is continent, bathes, and dresses himself, but has issues with going on the computer, cooking, etc. Between the anger, delusions, and the overall decline in his functional status, wife is concerned with his decline and having trouble coping. Eating and sleeping fine, moving bowels regularly, continent x 2.    Soniya (wife): [phone] [address] Children Dr. Shirley (neuro) Dr. Johanna Madrigal Psych: [phone] Retired Pathologist at [employer]

## 2024-01-23 ENCOUNTER — APPOINTMENT (OUTPATIENT)
Dept: GERIATRICS | Facility: CLINIC | Age: 84
End: 2024-01-23
Payer: COMMERCIAL

## 2024-01-23 VITALS
SYSTOLIC BLOOD PRESSURE: 159 MMHG | DIASTOLIC BLOOD PRESSURE: 88 MMHG | OXYGEN SATURATION: 99 % | HEART RATE: 61 BPM | RESPIRATION RATE: 16 BRPM | TEMPERATURE: 97.6 F

## 2024-01-23 DIAGNOSIS — G31.01 PICK'S DISEASE: ICD-10-CM

## 2024-01-23 DIAGNOSIS — F02.80 PICK'S DISEASE: ICD-10-CM

## 2024-01-23 DIAGNOSIS — Z71.89 OTHER SPECIFIED COUNSELING: ICD-10-CM

## 2024-01-23 PROCEDURE — 99215 OFFICE O/P EST HI 40 MIN: CPT

## 2024-01-24 PROBLEM — G31.01 PRIMARY PROGRESSIVE APHASIA: Status: ACTIVE | Noted: 2022-10-25

## 2024-01-24 PROBLEM — Z71.89 COUNSELING REGARDING ADVANCE DIRECTIVES AND GOALS OF CARE: Status: ACTIVE | Noted: 2024-01-24

## 2024-01-24 NOTE — HISTORY OF PRESENT ILLNESS
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 He believes his wife is rosario, that his life was ruined by his pinky fracture a few years ago, and various other situations that upset him. Per speaking with wife privately, he has delusions mainly channeled via anger toward her that he fixates upon. Soniya works full time as a pediatric anesthesiologist at [employer], and they are in the process of getting patient more help at home as she is concerned about his risk of falls. He has been resistant to all other caregivers aside from their longtime , Robin, whom Soniya is working on increasing her hours with for better coverage. Patient drinks 2-3 glasses of wine a day religiously. He still is continent, bathes, and dresses himself, but has issues with going on the computer, cooking, etc. Between the anger, delusions, and the overall decline in his functional status, wife is concerned with his decline and having trouble coping. Eating and sleeping fine, moving bowels regularly, continent x 2.   RX: Trial of Risperidone 0.25mg PO JULY BECK completed, referred wife to [facility] LCSW for caregiver support  1/23 (f/u):  Wife had noted increase in confusion since stopping Namenda and was advised to re-start. She did not notice any difference with it back on and has now stopped it again. She still feels he is overall a bit more confused since starting the Risperdone- though confusion was present prior. Had an episode of fecal incontinence this weekend. Overall his "rants" are less volatile and less often since starting the Risperidone, though still present.  She is still looking into ALFs. Patient is also aware of word finding difficulties and confusion at times.   Soniya (wife): [phone] [address] Children Dr. Shirley (neuro) Dr. Johanna Madrigal Psych: [phone] Retired Pathologist at [employer]

## 2024-01-24 NOTE — ASSESSMENT
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 PPS 50- 60%.   # Dementia with behavioral disturbances - C/w Risperidone 0.25mg PO QAM- likely source of confusion but he is resistant to taking at night - Currently off Namenda, advised to f/u with geriatrics for titration  - Increased fall risk due to ETOH + preexisting shuffling gait- wife aware - Seroquel PRN, though rarely gives  RTC in 1 month or PRN sooner

## 2024-02-02 ENCOUNTER — APPOINTMENT (OUTPATIENT)
Dept: GERIATRICS | Facility: CLINIC | Age: 84
End: 2024-02-02
Payer: COMMERCIAL

## 2024-02-02 VITALS
HEART RATE: 56 BPM | SYSTOLIC BLOOD PRESSURE: 110 MMHG | TEMPERATURE: 97.4 F | WEIGHT: 138 LBS | DIASTOLIC BLOOD PRESSURE: 84 MMHG | BODY MASS INDEX: 23.69 KG/M2 | OXYGEN SATURATION: 99 %

## 2024-02-02 PROCEDURE — 99213 OFFICE O/P EST LOW 20 MIN: CPT

## 2024-02-02 RX ORDER — ROSUVASTATIN CALCIUM 10 MG/1
10 TABLET, FILM COATED ORAL
Refills: 0 | Status: COMPLETED | COMMUNITY
End: 2024-02-02

## 2024-02-02 RX ORDER — EZETIMIBE 10 MG/1
10 TABLET ORAL DAILY
Qty: 90 | Refills: 3 | Status: COMPLETED | COMMUNITY
End: 2024-02-02

## 2024-02-02 RX ORDER — MEMANTINE HYDROCHLORIDE 28 MG/1
28 CAPSULE, EXTENDED RELEASE ORAL
Qty: 90 | Refills: 3 | Status: COMPLETED | COMMUNITY
Start: 2021-04-12 | End: 2024-02-02

## 2024-02-02 RX ORDER — MEMANTINE HYDROCHLORIDE 21 MG/1
21 CAPSULE, EXTENDED RELEASE ORAL
Qty: 30 | Refills: 0 | Status: COMPLETED | COMMUNITY
Start: 2024-01-11 | End: 2024-02-02

## 2024-02-07 ENCOUNTER — NON-APPOINTMENT (OUTPATIENT)
Age: 84
End: 2024-02-07

## 2024-02-07 ENCOUNTER — APPOINTMENT (OUTPATIENT)
Dept: GERIATRICS | Facility: CLINIC | Age: 84
End: 2024-02-07
Payer: COMMERCIAL

## 2024-02-07 PROCEDURE — 99442: CPT

## 2024-02-08 NOTE — ASSESSMENT
[FreeTextEntry1] : At this stage, there likely will be no added benefit of memantine, will not restart.  Will also taper off donepezil, take 5 mg, half tab x2 weeks then stop. Ideally would be best to have risperidone BID but patient resistant with medications any other time than AM.  Will increase risperidone to 0.5 mg daily.

## 2024-02-08 NOTE — PHYSICAL EXAM
[No Acute Distress] : no acute distress [No Respiratory Distress] : no respiratory distress  [Clear to Auscultation] : lungs were clear to auscultation bilaterally [Normal Rate] : normal rate  [Regular Rhythm] : with a regular rhythm [Normal S1, S2] : normal S1 and S2 [No Edema] : there was no peripheral edema [Speech Grossly Normal] : speech grossly normal [de-identified] : Agitated

## 2024-02-08 NOTE — HISTORY OF PRESENT ILLNESS
[Spouse] : spouse [FreeTextEntry1] : FU [de-identified] : Sent back to geriatrics by palliative for memantine titration.  Increased hostility towards wife.  Very upset that wife had gone away for a few days, "leaving me alone."  Son and  who they have known for years were with him, he was unable to recognize them.    Had side effects with quetiapine so not using.    On risperidone 0.25 mg in AM.  Patient will not take any medicaitons in the PM, gives wife a hard time.    Patient is adamant that he "wants to be left alone," does not want any further interventions.

## 2024-02-21 ENCOUNTER — APPOINTMENT (OUTPATIENT)
Dept: GERIATRICS | Facility: CLINIC | Age: 84
End: 2024-02-21
Payer: COMMERCIAL

## 2024-02-21 DIAGNOSIS — R26.81 UNSTEADINESS ON FEET: ICD-10-CM

## 2024-02-21 DIAGNOSIS — R41.0 DISORIENTATION, UNSPECIFIED: ICD-10-CM

## 2024-02-21 DIAGNOSIS — Z51.5 ENCOUNTER FOR PALLIATIVE CARE: ICD-10-CM

## 2024-02-21 DIAGNOSIS — F02.80 DEMENTIA IN OTHER DISEASES CLASSIFIED ELSEWHERE W/OUT BEHAVIORAL DISTURBANCE: ICD-10-CM

## 2024-02-21 PROCEDURE — 99202 OFFICE O/P NEW SF 15 MIN: CPT

## 2024-02-21 PROCEDURE — 99212 OFFICE O/P EST SF 10 MIN: CPT

## 2024-02-21 RX ORDER — DONEPEZIL HYDROCHLORIDE 10 MG/1
10 TABLET ORAL
Qty: 90 | Refills: 3 | Status: DISCONTINUED | COMMUNITY
Start: 1900-01-01 | End: 2024-02-21

## 2024-03-20 ENCOUNTER — APPOINTMENT (OUTPATIENT)
Dept: GERIATRICS | Facility: CLINIC | Age: 84
End: 2024-03-20
Payer: COMMERCIAL

## 2024-03-20 DIAGNOSIS — F02.818 ALZHEIMER'S DISEASE, UNSPECIFIED: ICD-10-CM

## 2024-03-20 DIAGNOSIS — G30.9 ALZHEIMER'S DISEASE, UNSPECIFIED: ICD-10-CM

## 2024-03-20 PROCEDURE — 99442: CPT

## 2024-03-20 RX ORDER — LORAZEPAM 0.5 MG/1
0.5 TABLET ORAL TWICE DAILY
Qty: 10 | Refills: 0 | Status: ACTIVE | COMMUNITY
Start: 2024-03-20 | End: 1900-01-01

## 2024-03-20 RX ORDER — RISPERIDONE 0.5 MG/1
0.5 TABLET, FILM COATED ORAL DAILY
Qty: 90 | Refills: 0 | Status: COMPLETED | COMMUNITY
Start: 2024-01-08 | End: 2024-03-20